# Patient Record
Sex: MALE | Race: WHITE | NOT HISPANIC OR LATINO | Employment: UNEMPLOYED | URBAN - METROPOLITAN AREA
[De-identification: names, ages, dates, MRNs, and addresses within clinical notes are randomized per-mention and may not be internally consistent; named-entity substitution may affect disease eponyms.]

---

## 2020-01-15 ENCOUNTER — ANESTHESIA EVENT (OUTPATIENT)
Dept: SURGERY | Facility: HOSPITAL | Age: 40
End: 2020-01-15
Payer: MEDICAID

## 2020-01-15 ENCOUNTER — HOSPITAL ENCOUNTER (OUTPATIENT)
Dept: PREADMISSION TESTING | Facility: HOSPITAL | Age: 40
Discharge: HOME | End: 2020-01-15
Attending: ORTHOPAEDIC SURGERY
Payer: MEDICAID

## 2020-01-15 DIAGNOSIS — Z01.818 PREOP EXAMINATION: Primary | ICD-10-CM

## 2020-01-15 RX ORDER — ACETAMINOPHEN 500 MG
1000 TABLET ORAL
Status: CANCELLED | OUTPATIENT
Start: 2020-01-16

## 2020-01-15 RX ORDER — LIDOCAINE HYDROCHLORIDE 10 MG/ML
1 INJECTION, SOLUTION EPIDURAL; INFILTRATION; INTRACAUDAL; PERINEURAL ONCE
Status: CANCELLED | OUTPATIENT
Start: 2020-01-15 | End: 2020-01-15

## 2020-01-15 RX ORDER — SODIUM CHLORIDE, SODIUM LACTATE, POTASSIUM CHLORIDE, CALCIUM CHLORIDE 600; 310; 30; 20 MG/100ML; MG/100ML; MG/100ML; MG/100ML
INJECTION, SOLUTION INTRAVENOUS CONTINUOUS
Status: CANCELLED | OUTPATIENT
Start: 2020-01-15

## 2020-01-15 RX ORDER — CEFAZOLIN SODIUM 2 G/50ML
2 SOLUTION INTRAVENOUS
Status: CANCELLED | OUTPATIENT
Start: 2020-01-16

## 2020-01-15 NOTE — ANESTHESIA PREPROCEDURE EVALUATION
01/15/2020  Kojo Parada is a 39 y.o., male scheduled for right capsulorrhaphy under REG/GETA on 1/16/2020.    Past Surgical History:   Procedure Laterality Date    CHOLECYSTECTOMY      TONSILLECTOMY      TYMPANOSTOMY TUBE PLACEMENT       Anesthesia Evaluation    I have reviewed the Patient Summary Reports.    I have reviewed the Nursing Notes.   I have reviewed the Medications.     Review of Systems  Anesthesia Hx:  No problems with previous Anesthesia  Denies Family Hx of Anesthesia complications.    Social:  Smoker, Social Alcohol Use    Hematology/Oncology:  Hematology Normal        Cardiovascular:  Cardiovascular Normal Exercise tolerance: good   Denies Angina.    Pulmonary:   Denies Shortness of breath.    Renal/:  Renal/ Normal     Hepatic/GI:  Hepatic/GI Normal    Neurological:  Neurology Normal    Endocrine:  Endocrine Normal        Physical Exam  General:  Well nourished    Airway/Jaw/Neck:  Airway Findings: Mouth Opening: Normal Tongue: Normal  General Airway Assessment: Adult  Mallampati: III       Chest/Lungs:  Chest/Lungs Findings: Clear to auscultation, Normal Respiratory Rate     Heart/Vascular:  Heart Findings: Rate: Normal  Rhythm: Regular Rhythm  Sounds: Normal        Mental Status:  Mental Status Findings:  Cooperative, Alert and Oriented         Anesthesia Plan  Type of Anesthesia, risks & benefits discussed:  Anesthesia Type:  regional, general  Patient's Preference:   Intra-op Monitoring Plan: standard ASA monitors  Intra-op Monitoring Plan Comments:   Post Op Pain Control Plan: multimodal analgesia  Post Op Pain Control Plan Comments:   Induction:   IV  Beta Blocker:  Patient is not currently on a Beta-Blocker (No further documentation required).       Informed Consent: Patient understands risks and agrees with Anesthesia plan.  Questions answered. Anesthesia consent  signed with patient.  ASA Score: 2     Day of Surgery Review of History & Physical:        Anesthesia Plan Notes: Anesthesia consent to be signed prior to procedure on 1/16/2020          Ready For Surgery From Anesthesia Perspective.

## 2020-01-15 NOTE — DISCHARGE INSTRUCTIONS
Your surgery is scheduled for 1/16/20.    Please report to Procedure Check In Room on the 2nd FLOOR at 5:30 a.m.          INSTRUCTIONS IMPORTANT!!!  ¨ Do not eat or drink after 12 midnight-including water. OK to brush teeth, no   gum, candy or mints!      ____  Proceed to [facility] on 1/15/20 for additional testing.        ____  Do not wear makeup, including mascara.  ____  No powder, lotions or creams to surgical area.  ____  Please remove all jewelry, including piercings and leave at home.  ____  No money or valuables needed. Please leave at home.  ____  Please bring any documents given by your doctor.  ____  If going home the same day, arrange for a ride home. You will not be able to             drive if Anesthesia was used.  ____  Children under 18 years require a parent / guardian present the entire time             they are in surgery / recovery.  ____  Wear loose fitting clothing. Allow for dressings, bandages.  ____  Stop Aspirin, Ibuprofen, Motrin and Aleve at least 3-5 days before surgery, unless otherwise instructed by your doctor, or the nurse.   You MAY use Tylenol/acetaminophen until day of surgery.  ____  Wash the surgical area with Hibiclens the night before surgery, and again the             morning of surgery.  Be sure to rinse hibiclens off completely (if instructed by nurse).  ____  If you take diabetic medication, do not take am of surgery unless instructed by Doctor.  ____  Call MD for temperature above 101 degrees or any other signs of infection such as Urinary (bladder) infection, Upper respiratory infection, skin boils, etc.  ____ Stop taking any Fish Oil supplement or any Vitamins that contain Vitamin E at least 5 days prior to surgery.  ____ Do Not wear your contact lenses the day of your procedure.  You may wear your glasses.      ____Do not shave surgical site for 3 days prior to surgery.  ____ Practice Good hand washing before, during, and after procedure.      I have  read or had read and explained to me, and understand the above information.  Additional comments or instructions:  For additional questions call [phone]      ANESTHESIA SIDE EFFECTS  -For the first 24 hours after surgery:  Do not drive, use heavy equipment, make important decisions, or drink alcohol  -It is normal to feel sleepy for several hours.  Rest until you are more awake.  -Have someone stay with you, if needed.  They can watch for problems and help keep you safe.  -Some possible post anesthesia side effects include: nausea and vomiting, sore throat and hoarseness, sleepiness, and dizziness.        Pre-Op Bathing Instructions    Before surgery, you can play an important role in your own health.    Because skin is not sterile, we need to be sure that your skin is as free of germs as possible. By following the instructions below, you can reduce the number of germs on your skin before surgery.    IMPORTANT: You will need to shower with a special soap called Hibiclens*, available at any pharmacy.  If you are allergic to Chlorhexidine (the antiseptic in Hibiclens), use an antibacterial soap such as Dial Soap for your preoperative shower.  You will shower with Hibiclens both the night before your surgery and the morning of your surgery.  Do not use Hibiclens on the head, face or genitals to avoid injury to those areas.    STEP #1: THE NIGHT BEFORE YOUR SURGERY     1. Do not shave the area of your body where your surgery will be performed.  2. Shower and wash your hair and body as usual with your normal soap and shampoo.  3. Rinse your hair and body thoroughly after you shower to remove all soap residue.  4. With your hand, apply one packet of Hibiclens soap to the surgical site.   5. Wash the site gently for five (5) minutes. Do not scrub your skin too hard.   6. Do not wash with your regular soap after Hibiclens is used.  7. Rinse your body thoroughly.  8. Pat yourself dry with a clean, soft towel.  9. Do not use  lotion, cream, or powder.  10. Wear clean clothes.    STEP #2: THE MORNING OF YOUR SURGERY     1. Repeat Step #1.    * Not to be used by people allergic to Chlorhexidine.

## 2020-01-16 ENCOUNTER — ANESTHESIA (OUTPATIENT)
Dept: SURGERY | Facility: HOSPITAL | Age: 40
End: 2020-01-16
Payer: MEDICAID

## 2020-01-16 ENCOUNTER — HOSPITAL ENCOUNTER (OUTPATIENT)
Facility: HOSPITAL | Age: 40
Discharge: HOME | End: 2020-01-16
Attending: ORTHOPAEDIC SURGERY | Admitting: ORTHOPAEDIC SURGERY
Payer: MEDICAID

## 2020-01-16 VITALS
BODY MASS INDEX: 35.21 KG/M2 | OXYGEN SATURATION: 98 % | SYSTOLIC BLOOD PRESSURE: 115 MMHG | HEART RATE: 84 BPM | WEIGHT: 260 LBS | DIASTOLIC BLOOD PRESSURE: 71 MMHG | TEMPERATURE: 98 F | RESPIRATION RATE: 20 BRPM | HEIGHT: 72 IN

## 2020-01-16 DIAGNOSIS — Z98.890 S/P SHOULDER SURGERY: ICD-10-CM

## 2020-01-16 DIAGNOSIS — Z01.818 PREOP EXAMINATION: ICD-10-CM

## 2020-01-16 PROCEDURE — 71000039 HC RECOVERY, EACH ADD'L HOUR: Performed by: ORTHOPAEDIC SURGERY

## 2020-01-16 PROCEDURE — 71000016 HC POSTOP RECOV ADDL HR: Performed by: ORTHOPAEDIC SURGERY

## 2020-01-16 PROCEDURE — 25000003 PHARM REV CODE 250: Performed by: ORTHOPAEDIC SURGERY

## 2020-01-16 PROCEDURE — 63600175 PHARM REV CODE 636 W HCPCS: Performed by: NURSE PRACTITIONER

## 2020-01-16 PROCEDURE — 25000003 PHARM REV CODE 250: Performed by: STUDENT IN AN ORGANIZED HEALTH CARE EDUCATION/TRAINING PROGRAM

## 2020-01-16 PROCEDURE — 63600175 PHARM REV CODE 636 W HCPCS: Performed by: STUDENT IN AN ORGANIZED HEALTH CARE EDUCATION/TRAINING PROGRAM

## 2020-01-16 PROCEDURE — 71000033 HC RECOVERY, INTIAL HOUR: Performed by: ORTHOPAEDIC SURGERY

## 2020-01-16 PROCEDURE — 37000008 HC ANESTHESIA 1ST 15 MINUTES: Performed by: ORTHOPAEDIC SURGERY

## 2020-01-16 PROCEDURE — C1713 ANCHOR/SCREW BN/BN,TIS/BN: HCPCS | Performed by: ORTHOPAEDIC SURGERY

## 2020-01-16 PROCEDURE — 63600175 PHARM REV CODE 636 W HCPCS: Performed by: ORTHOPAEDIC SURGERY

## 2020-01-16 PROCEDURE — 37000009 HC ANESTHESIA EA ADD 15 MINS: Performed by: ORTHOPAEDIC SURGERY

## 2020-01-16 PROCEDURE — C1769 GUIDE WIRE: HCPCS | Performed by: ORTHOPAEDIC SURGERY

## 2020-01-16 PROCEDURE — 71000015 HC POSTOP RECOV 1ST HR: Performed by: ORTHOPAEDIC SURGERY

## 2020-01-16 PROCEDURE — 64415 NJX AA&/STRD BRCH PLXS IMG: CPT | Mod: RT,59 | Performed by: STUDENT IN AN ORGANIZED HEALTH CARE EDUCATION/TRAINING PROGRAM

## 2020-01-16 PROCEDURE — 76942 ECHO GUIDE FOR BIOPSY: CPT | Performed by: STUDENT IN AN ORGANIZED HEALTH CARE EDUCATION/TRAINING PROGRAM

## 2020-01-16 PROCEDURE — 36000707: Performed by: ORTHOPAEDIC SURGERY

## 2020-01-16 PROCEDURE — C9290 INJ, BUPIVACAINE LIPOSOME: HCPCS | Performed by: STUDENT IN AN ORGANIZED HEALTH CARE EDUCATION/TRAINING PROGRAM

## 2020-01-16 PROCEDURE — 01630 ANES OPN/ARTHR PX SHO JT NOS: CPT | Performed by: ORTHOPAEDIC SURGERY

## 2020-01-16 PROCEDURE — 36000706: Performed by: ORTHOPAEDIC SURGERY

## 2020-01-16 PROCEDURE — 27201423 OPTIME MED/SURG SUP & DEVICES STERILE SUPPLY: Performed by: ORTHOPAEDIC SURGERY

## 2020-01-16 RX ORDER — HYDROMORPHONE HYDROCHLORIDE 2 MG/ML
0.5 INJECTION, SOLUTION INTRAMUSCULAR; INTRAVENOUS; SUBCUTANEOUS EVERY 5 MIN PRN
Status: ACTIVE | OUTPATIENT
Start: 2020-01-16 | End: 2020-01-16

## 2020-01-16 RX ORDER — CEFAZOLIN SODIUM 1 G/3ML
INJECTION, POWDER, FOR SOLUTION INTRAMUSCULAR; INTRAVENOUS
Status: DISCONTINUED | OUTPATIENT
Start: 2020-01-16 | End: 2020-01-16

## 2020-01-16 RX ORDER — KETOROLAC TROMETHAMINE 30 MG/ML
INJECTION, SOLUTION INTRAMUSCULAR; INTRAVENOUS
Status: DISCONTINUED | OUTPATIENT
Start: 2020-01-16 | End: 2020-01-16

## 2020-01-16 RX ORDER — BUPIVACAINE HYDROCHLORIDE 2.5 MG/ML
INJECTION, SOLUTION EPIDURAL; INFILTRATION; INTRACAUDAL
Status: DISCONTINUED | OUTPATIENT
Start: 2020-01-16 | End: 2020-01-16

## 2020-01-16 RX ORDER — ONDANSETRON 2 MG/ML
INJECTION INTRAMUSCULAR; INTRAVENOUS
Status: DISCONTINUED | OUTPATIENT
Start: 2020-01-16 | End: 2020-01-16

## 2020-01-16 RX ORDER — MIDAZOLAM HYDROCHLORIDE 1 MG/ML
INJECTION, SOLUTION INTRAMUSCULAR; INTRAVENOUS
Status: DISCONTINUED | OUTPATIENT
Start: 2020-01-16 | End: 2020-01-16

## 2020-01-16 RX ORDER — FENTANYL CITRATE 50 UG/ML
INJECTION, SOLUTION INTRAMUSCULAR; INTRAVENOUS
Status: DISCONTINUED | OUTPATIENT
Start: 2020-01-16 | End: 2020-01-16

## 2020-01-16 RX ORDER — OXYCODONE AND ACETAMINOPHEN 10; 325 MG/1; MG/1
1 TABLET ORAL EVERY 4 HOURS PRN
Qty: 28 TABLET | Refills: 0 | Status: SHIPPED | OUTPATIENT
Start: 2020-01-16

## 2020-01-16 RX ORDER — NEOSTIGMINE METHYLSULFATE 1 MG/ML
INJECTION, SOLUTION INTRAVENOUS
Status: DISCONTINUED | OUTPATIENT
Start: 2020-01-16 | End: 2020-01-16

## 2020-01-16 RX ORDER — GLYCOPYRROLATE 0.2 MG/ML
INJECTION INTRAMUSCULAR; INTRAVENOUS
Status: DISCONTINUED | OUTPATIENT
Start: 2020-01-16 | End: 2020-01-16

## 2020-01-16 RX ORDER — LIDOCAINE HCL/PF 100 MG/5ML
SYRINGE (ML) INTRAVENOUS
Status: DISCONTINUED | OUTPATIENT
Start: 2020-01-16 | End: 2020-01-16

## 2020-01-16 RX ORDER — ACETAMINOPHEN 500 MG
1000 TABLET ORAL
Status: DISCONTINUED | OUTPATIENT
Start: 2020-01-16 | End: 2020-01-16 | Stop reason: HOSPADM

## 2020-01-16 RX ORDER — PROPOFOL 10 MG/ML
VIAL (ML) INTRAVENOUS
Status: DISCONTINUED | OUTPATIENT
Start: 2020-01-16 | End: 2020-01-16

## 2020-01-16 RX ORDER — SUCCINYLCHOLINE CHLORIDE 20 MG/ML
INJECTION INTRAMUSCULAR; INTRAVENOUS
Status: DISCONTINUED | OUTPATIENT
Start: 2020-01-16 | End: 2020-01-16

## 2020-01-16 RX ORDER — HYDROMORPHONE HYDROCHLORIDE 2 MG/ML
0.2 INJECTION, SOLUTION INTRAMUSCULAR; INTRAVENOUS; SUBCUTANEOUS EVERY 5 MIN PRN
Status: ACTIVE | OUTPATIENT
Start: 2020-01-16 | End: 2020-01-16

## 2020-01-16 RX ORDER — ROCURONIUM BROMIDE 10 MG/ML
INJECTION, SOLUTION INTRAVENOUS
Status: DISCONTINUED | OUTPATIENT
Start: 2020-01-16 | End: 2020-01-16

## 2020-01-16 RX ORDER — OXYCODONE AND ACETAMINOPHEN 5; 325 MG/1; MG/1
1 TABLET ORAL ONCE
Status: COMPLETED | OUTPATIENT
Start: 2020-01-16 | End: 2020-01-16

## 2020-01-16 RX ORDER — OXYCODONE HYDROCHLORIDE 5 MG/1
5 TABLET ORAL
Status: DISCONTINUED | OUTPATIENT
Start: 2020-01-16 | End: 2020-01-16 | Stop reason: HOSPADM

## 2020-01-16 RX ORDER — SODIUM CHLORIDE, SODIUM LACTATE, POTASSIUM CHLORIDE, CALCIUM CHLORIDE 600; 310; 30; 20 MG/100ML; MG/100ML; MG/100ML; MG/100ML
INJECTION, SOLUTION INTRAVENOUS CONTINUOUS PRN
Status: DISCONTINUED | OUTPATIENT
Start: 2020-01-16 | End: 2020-01-16

## 2020-01-16 RX ORDER — HYDROMORPHONE HYDROCHLORIDE 2 MG/ML
INJECTION, SOLUTION INTRAMUSCULAR; INTRAVENOUS; SUBCUTANEOUS
Status: DISCONTINUED | OUTPATIENT
Start: 2020-01-16 | End: 2020-01-16

## 2020-01-16 RX ORDER — PHENYLEPHRINE HYDROCHLORIDE 10 MG/ML
INJECTION INTRAVENOUS
Status: DISCONTINUED | OUTPATIENT
Start: 2020-01-16 | End: 2020-01-16

## 2020-01-16 RX ORDER — EPINEPHRINE 1 MG/ML
INJECTION INTRAMUSCULAR; INTRAVENOUS; SUBCUTANEOUS
Status: DISCONTINUED | OUTPATIENT
Start: 2020-01-16 | End: 2020-01-16 | Stop reason: HOSPADM

## 2020-01-16 RX ORDER — SODIUM CHLORIDE, SODIUM LACTATE, POTASSIUM CHLORIDE, CALCIUM CHLORIDE 600; 310; 30; 20 MG/100ML; MG/100ML; MG/100ML; MG/100ML
INJECTION, SOLUTION INTRAVENOUS CONTINUOUS
Status: DISCONTINUED | OUTPATIENT
Start: 2020-01-16 | End: 2020-01-16 | Stop reason: HOSPADM

## 2020-01-16 RX ORDER — LIDOCAINE HYDROCHLORIDE 10 MG/ML
1 INJECTION, SOLUTION EPIDURAL; INFILTRATION; INTRACAUDAL; PERINEURAL ONCE
Status: DISCONTINUED | OUTPATIENT
Start: 2020-01-16 | End: 2020-01-16 | Stop reason: HOSPADM

## 2020-01-16 RX ORDER — ONDANSETRON 2 MG/ML
4 INJECTION INTRAMUSCULAR; INTRAVENOUS DAILY PRN
Status: DISCONTINUED | OUTPATIENT
Start: 2020-01-16 | End: 2020-01-16 | Stop reason: HOSPADM

## 2020-01-16 RX ADMIN — PHENYLEPHRINE HYDROCHLORIDE 100 MCG: 10 INJECTION INTRAVENOUS at 09:01

## 2020-01-16 RX ADMIN — SODIUM CHLORIDE, SODIUM LACTATE, POTASSIUM CHLORIDE, AND CALCIUM CHLORIDE: .6; .31; .03; .02 INJECTION, SOLUTION INTRAVENOUS at 06:01

## 2020-01-16 RX ADMIN — PHENYLEPHRINE HYDROCHLORIDE 100 MCG: 10 INJECTION INTRAVENOUS at 08:01

## 2020-01-16 RX ADMIN — PHENYLEPHRINE HYDROCHLORIDE 100 MCG: 10 INJECTION INTRAVENOUS at 10:01

## 2020-01-16 RX ADMIN — PHENYLEPHRINE HYDROCHLORIDE 200 MCG: 10 INJECTION INTRAVENOUS at 07:01

## 2020-01-16 RX ADMIN — BUPIVACAINE HYDROCHLORIDE 10 ML: 2.5 INJECTION, SOLUTION EPIDURAL; INFILTRATION; INTRACAUDAL; PERINEURAL at 06:01

## 2020-01-16 RX ADMIN — PHENYLEPHRINE HYDROCHLORIDE 200 MCG: 10 INJECTION INTRAVENOUS at 10:01

## 2020-01-16 RX ADMIN — SUCCINYLCHOLINE CHLORIDE 120 MG: 20 INJECTION, SOLUTION INTRAMUSCULAR; INTRAVENOUS at 07:01

## 2020-01-16 RX ADMIN — ROCURONIUM BROMIDE 10 MG: 10 INJECTION, SOLUTION INTRAVENOUS at 08:01

## 2020-01-16 RX ADMIN — ACETAMINOPHEN 1000 MG: 500 TABLET ORAL at 06:01

## 2020-01-16 RX ADMIN — MIDAZOLAM 5 MG: 1 INJECTION INTRAMUSCULAR; INTRAVENOUS at 06:01

## 2020-01-16 RX ADMIN — HYDROMORPHONE HYDROCHLORIDE 0.4 MG: 2 INJECTION INTRAMUSCULAR; INTRAVENOUS; SUBCUTANEOUS at 10:01

## 2020-01-16 RX ADMIN — HYDROMORPHONE HYDROCHLORIDE 0.6 MG: 2 INJECTION INTRAMUSCULAR; INTRAVENOUS; SUBCUTANEOUS at 09:01

## 2020-01-16 RX ADMIN — ROCURONIUM BROMIDE 10 MG: 10 INJECTION, SOLUTION INTRAVENOUS at 09:01

## 2020-01-16 RX ADMIN — OXYCODONE HYDROCHLORIDE AND ACETAMINOPHEN 1 TABLET: 5; 325 TABLET ORAL at 11:01

## 2020-01-16 RX ADMIN — KETOROLAC TROMETHAMINE 30 MG: 30 INJECTION, SOLUTION INTRAMUSCULAR; INTRAVENOUS at 10:01

## 2020-01-16 RX ADMIN — ROCURONIUM BROMIDE 10 MG: 10 INJECTION, SOLUTION INTRAVENOUS at 07:01

## 2020-01-16 RX ADMIN — CEFAZOLIN 2 G: 330 INJECTION, POWDER, FOR SOLUTION INTRAMUSCULAR; INTRAVENOUS at 07:01

## 2020-01-16 RX ADMIN — BUPIVACAINE 10 ML: 13.3 INJECTION, SUSPENSION, LIPOSOMAL INFILTRATION at 06:01

## 2020-01-16 RX ADMIN — SODIUM CHLORIDE, SODIUM LACTATE, POTASSIUM CHLORIDE, AND CALCIUM CHLORIDE: .6; .31; .03; .02 INJECTION, SOLUTION INTRAVENOUS at 07:01

## 2020-01-16 RX ADMIN — HYDROMORPHONE HYDROCHLORIDE 0.5 MG: 2 INJECTION INTRAMUSCULAR; INTRAVENOUS; SUBCUTANEOUS at 10:01

## 2020-01-16 RX ADMIN — OXYCODONE HYDROCHLORIDE 5 MG: 5 TABLET ORAL at 11:01

## 2020-01-16 RX ADMIN — SODIUM CHLORIDE, SODIUM LACTATE, POTASSIUM CHLORIDE, AND CALCIUM CHLORIDE: .6; .31; .03; .02 INJECTION, SOLUTION INTRAVENOUS at 08:01

## 2020-01-16 RX ADMIN — NEOSTIGMINE METHYLSULFATE 5 MG: 1 INJECTION INTRAVENOUS at 10:01

## 2020-01-16 RX ADMIN — PROPOFOL 200 MG: 10 INJECTION, EMULSION INTRAVENOUS at 07:01

## 2020-01-16 RX ADMIN — LIDOCAINE HYDROCHLORIDE 100 MG: 20 INJECTION, SOLUTION INTRAVENOUS at 07:01

## 2020-01-16 RX ADMIN — ROCURONIUM BROMIDE 5 MG: 10 INJECTION, SOLUTION INTRAVENOUS at 07:01

## 2020-01-16 RX ADMIN — GLYCOPYRROLATE 0.8 MG: 0.2 INJECTION, SOLUTION INTRAMUSCULAR; INTRAVENOUS at 10:01

## 2020-01-16 RX ADMIN — ROCURONIUM BROMIDE 20 MG: 10 INJECTION, SOLUTION INTRAVENOUS at 08:01

## 2020-01-16 RX ADMIN — ROCURONIUM BROMIDE 25 MG: 10 INJECTION, SOLUTION INTRAVENOUS at 07:01

## 2020-01-16 RX ADMIN — ONDANSETRON 4 MG: 2 INJECTION, SOLUTION INTRAMUSCULAR; INTRAVENOUS at 10:01

## 2020-01-16 RX ADMIN — FENTANYL CITRATE 100 MCG: 50 INJECTION, SOLUTION INTRAMUSCULAR; INTRAVENOUS at 07:01

## 2020-01-16 RX ADMIN — PHENYLEPHRINE HYDROCHLORIDE 50 MCG: 10 INJECTION INTRAVENOUS at 08:01

## 2020-01-16 NOTE — ANESTHESIA PROCEDURE NOTES
Peripheral Block    Patient location during procedure: pre-op   Block not for primary anesthetic.  Reason for block: at surgeon's request and post-op pain management   Post-op Pain Location: right arm   Start time: 1/16/2020 6:52 AM  Timeout: 1/16/2020 6:50 AM   End time: 1/16/2020 6:54 AM    Staffing  Authorizing Provider: Akhil Pacheco MD  Performing Provider: Stephanie Dodd MD    Preanesthetic Checklist  Completed: patient identified, site marked, surgical consent, pre-op evaluation, timeout performed, IV checked, risks and benefits discussed and monitors and equipment checked  Peripheral Block  Patient position: sitting  Prep: ChloraPrep  Patient monitoring: heart rate, cardiac monitor, continuous pulse ox, continuous capnometry and frequent blood pressure checks  Block type: interscalene  Laterality: right  Injection technique: single shot  Needle  Needle type: Stimuplex   Needle gauge: 21 G  Needle length: 4 in  Needle localization: anatomical landmarks and ultrasound guidance   -ultrasound image captured on disc.  Assessment  Injection assessment: negative aspiration, negative parasthesia and local visualized surrounding nerve  Paresthesia pain: none  Heart rate change: no  Slow fractionated injection: yes  Additional Notes  VSS.  [facility] RN monitoring vitals throughout procedure.  Patient tolerated procedure well.     10 cc of 0.25% bupivacaine with 10 cc of exparel injected without complication.

## 2020-01-16 NOTE — DISCHARGE INSTRUCTIONS
Shoulder Joint Surgery: Bankart Repair  A Bankart lesion is a shoulder injury. It happens when there is a tear in the labrum. This is the fibrous cartilage that helps hold the shoulder joint in place. Surgery can repair this injury. This surgery may be done through a few small incisions called arthroscopic surgery, or it may be done through one larger incision known as open surgery. You and your healthcare provider will discuss which method is right for you.    After Shoulder Surgery (SLAP Repair or Bankart Repair)    After repair of your shoulder joint, you may go home the same day of your surgery. Or you may spend 24 hours in the hospital. Take care of your shoulder while its healing and follow all instructions you are given. Full healing usually takes 3 to 4 months.  At home  Here is what to expect at home after surgery:   · Wear your sling or brace at all times, except when bathing or changing clothes. When your arm is out of the immobilizer, keep it at your side.  · You will need to wear your sling or brace for 3 to 4 weeks.  · Don't get your bandages or the incision(s) wet. Your surgeon may allow you to change your bandages after 2 or 3 days. Replace the bandages with a clean gauze dressing. Do not touch your incision.  · Take your pain medicines as prescribed. If the medicines dont control the pain, call your healthcare provider.  · Use ice on your shoulder, as instructed. Ice your shoulder for no more than 15 minutes at a time.  · See your healthcare provider for an appointment 7 to 10 days after surgery. During this appointment, your stitches or staples may be removed, if you have them.  · You will continue to have follow-up visits until your shoulder is healed. Keep these appointments to help ensure your shoulder heals properly.  · Don't drive until you are no longer taking prescription pain medicines and your healthcare provider says its OK, no sooner than two weeks after  surgery.  Rehabilitation  After surgery, you will be given exercises to do. You will be instructed to start certain range-of-motion exercises right after surgery. After the sling is removed, you will be given other exercises to help with the flexibility and strength of your shoulder and arm. In many cases, you may be referred to a physical therapist for rehabilitation.  Call your healthcare provider  Contact your healthcare provider if you have:  · An increase in pain or swelling  · Your arm tingles or feels numb  · You have a fever of 100.4°F (38°C) or higher, or as directed by your healthcare provider  · Your shoulder bleeds excessively or drains   Date Last Reviewed: 10/12/2015  © 6885-3873 [employer]. [address] [place] [address]. All rights reserved. This information is not intended as a substitute for professional medical care. Always follow your healthcare professional's instructions.        Possible risks and complications of shoulder surgery  Here are some the risks and complications:   · Infection  · Damage to nerves or blood vessels  · Moving or breaking of surgical anchors  · Stiffness or pain  · Recurring instability of the shoulder joint   Date Last Reviewed: 10/12/2015  © 9018-8245 [employer]. [address] [place] [address]. All rights reserved. This information is not intended as a substitute for professional medical care. Always follow your healthcare professional's instructions.    ANESTHESIA  -For the first 24 hours after surgery:  Do not drive, use heavy equipment, make important decisions, or drink alcohol  -It is normal to feel sleepy for several hours.  Rest until you are more awake.  -Have someone stay with you, if needed.  They can watch for problems and help keep you safe.  -Some possible post anesthesia side effects include: nausea and vomiting, sore throat and hoarseness, sleepiness, and dizziness.    PAIN  -If you have pain after  surgery, pain medicine will help you feel better.  Take it as directed, before pain becomes severe.  Most pain relievers taken by mouth need at least 20-30 minutes to start working.  -Do not drive or drink alcohol while taking pain medicine.  -Pain medication can upset your stomach.  Taking them with a little food may help.  -Other ways to help control pain: elevation, ice, and relaxation  -Call your surgeon if still having unmanageable pain an hour after taking pain medicine.  -Pain medicine can cause constipation.  Taking an over-the counter stool softener while on prescription pain medicine and drinking plenty of fluids can prevent this side effect.  -Call your surgeon if you have severe side effects like: breathing problems, trouble waking up, dizziness, confusion, or severe constipation.    NAUSEA  -Some people have nausea after surgery.  This is often because of anesthesia, pain, pain medicine, or the stress of surgery.  -Do not push yourself to eat.  Start off with clear liquids and soup.  Slowly move to solid foods.  Don't eat fatty, rich, spicy foods at first.  Eat smaller amounts.  -If you develop persistent nausea and vomiting please notify your surgeon immediately.    BLEEDING  -Different types of surgery require different types of care and dressing changes.  It is important to follow all instructions and advice from your surgeon.  Change dressing as directed.  Call your surgeon for any concerns regarding postop bleeding.    SIGNS OF INFECTION  -Signs of infection include: fever, swelling, drainage, and redness  -Notify your surgeon if you have a fever of 100.4 F (38.0 C) or higher.  -Notify your surgeon if you notice redness, swelling, increased pain, pus, or a foul smell at the incision site.      Oxycodone tablets or capsules  What is this medicine?  OXYCODONE (ox i KOE done) is a pain reliever. It is used to treat moderate to severe pain.  How should I use this medicine?  Take this medicine by mouth  with a glass of water. Follow the directions on the prescription label. You can take it with or without food. If it upsets your stomach, take it with food. Take your medicine at regular intervals. Do not take it more often than directed. Do not stop taking except on your doctor's advice.  Some brands of this medicine, like Oxecta, have special instructions. Ask your doctor or pharmacist if these directions are for you: Do not cut, crush or chew this medicine. Swallow only one tablet at a time. Do not wet, soak, or lick the tablet before you take it.  A special MedGuide will be given to you by the pharmacist with each prescription and refill. Be sure to read this information carefully each time.  Talk to your pediatrician regarding the use of this medicine in children. Special care may be needed.  What side effects may I notice from receiving this medicine?  Side effects that you should report to your doctor or health care professional as soon as possible:  · allergic reactions like skin rash, itching or hives, swelling of the face, lips, or tongue  · breathing problems  · confusion  · signs and symptoms of low blood pressure like dizziness; feeling faint or lightheaded, falls; unusually weak or tired  · trouble passing urine or change in the amount of urine  · trouble swallowing  Side effects that usually do not require medical attention (report to your doctor or health care professional if they continue or are bothersome):  · constipation  · dry mouth  · nausea, vomiting  · tiredness  What may interact with this medicine?  This medicine may interact with the following medications:  · alcohol  · antihistamines for allergy, cough and cold  · antiviral medicines for HIV or AIDS  · atropine  · certain antibiotics like clarithromycin, erythromycin, linezolid, rifampin  · certain medicines for anxiety or sleep  · certain medicines for bladder problems like oxybutynin, tolterodine  · certain medicines for depression like  amitriptyline, fluoxetine, sertraline  · certain medicines for fungal infections like ketoconazole, itraconazole, voriconazole  · certain medicines for migraine headache like almotriptan, eletriptan, frovatriptan, naratriptan, rizatriptan, sumatriptan, zolmitriptan  · certain medicines for nausea or vomiting like dolasetron, ondansetron, palonosetron  · certain medicines for Parkinson's disease like benztropine, trihexyphenidyl  · certain medicines for seizures like phenobarbital, phenytoin, primidone  · certain medicines for stomach problems like dicyclomine, hyoscyamine  · certain medicines for travel sickness like scopolamine  · diuretics  · general anesthetics like halothane, isoflurane, methoxyflurane, propofol  · ipratropium  · local anesthetics like lidocaine, pramoxine, tetracaine  · MAOIs like Carbex, Eldepryl, Marplan, Nardil, and Parnate  · medicines that relax muscles for surgery  · methylene blue  · nilotinib  · other narcotic medicines for pain or cough  · phenothiazines like chlorpromazine, mesoridazine, prochlorperazine, thioridazine  What if I miss a dose?  If you miss a dose, take it as soon as you can. If it is almost time for your next dose, take only that dose. Do not take double or extra doses.  Where should I keep my medicine?  Keep out of the reach of children. This medicine can be abused. Keep your medicine in a safe place to protect it from theft. Do not share this medicine with anyone. Selling or giving away this medicine is dangerous and against the law.  Store at room temperature between 15 and 30 degrees C (59 and 86 degrees F). Protect from light. Keep container tightly closed.  This medicine may cause accidental overdose and death if it is taken by other adults, children, or pets. Flush any unused medicine down the toilet to reduce the chance of harm. Do not use the medicine after the expiration date.  What should I tell my health care provider before I take this medicine?  They need  to know if you have any of these conditions:  · Humphreys's disease  · brain tumor  · head injury  · heart disease  · history of drug or alcohol abuse problem  · if you often drink alcohol  · kidney disease  · liver disease  · lung or breathing disease, like asthma  · mental illness  · pancreatic disease  · seizures  · thyroid disease  · an unusual or allergic reaction to oxycodone, codeine, hydrocodone, morphine, other medicines, foods, dyes, or preservatives  · pregnant or trying to get pregnant  · breast-feeding  What should I watch for while using this medicine?  Tell your doctor or health care professional if your pain does not go away, if it gets worse, or if you have new or a different type of pain. You may develop tolerance to the medicine. Tolerance means that you will need a higher dose of the medicine for pain relief. Tolerance is normal and is expected if you take this medicine for a long time.  Do not suddenly stop taking your medicine because you may develop a severe reaction. Your body becomes used to the medicine. This does NOT mean you are addicted. Addiction is a behavior related to getting and using a drug for a non-medical reason. If you have pain, you have a medical reason to take pain medicine. Your doctor will tell you how much medicine to take. If your doctor wants you to stop the medicine, the dose will be slowly lowered over time to avoid any side effects.  There are different types of narcotic medicines (opiates). If you take more than one type at the same time or if you are taking another medicine that also causes drowsiness, you may have more side effects. Give your health care provider a list of all medicines you use. Your doctor will tell you how much medicine to take. Do not take more medicine than directed. Call emergency for help if you have problems breathing or unusual sleepiness.  You may get drowsy or dizzy. Do not drive, use machinery, or do anything that needs mental alertness  until you know how the medicine affects you. Do not stand or sit up quickly, especially if you are an older patient. This reduces the risk of dizzy or fainting spells. Alcohol may interfere with the effect of this medicine. Avoid alcoholic drinks.  This medicine will cause constipation. Try to have a bowel movement at least every 2 to 3 days. If you do not have a bowel movement for 3 days, call your doctor or health care professional.  Your mouth may get dry. Chewing sugarless gum or sucking hard candy, and drinking plenty of water may help. Contact your doctor if the problem does not go away or is severe.  NOTE:This sheet is a summary. It may not cover all possible information. If you have questions about this medicine, talk to your doctor, pharmacist, or health care provider. Copyright© 2017 [employer]

## 2020-01-16 NOTE — PLAN OF CARE
Vomited small amount clear liquids; states feeling better after vomiting,refuses any antiemetic & voicing desire to go home. Discharge instructions given to patient & family; verbalized understanding. Discharge home via wheelchair in care of family.

## 2020-01-16 NOTE — PLAN OF CARE
Signed out from pacu per Dr Pacheco. Followed patient care to ops. Transported to ops via stretcher,sr upx2.

## 2020-01-16 NOTE — ANESTHESIA RELEASE NOTE
Anesthesia Release from PACU Note    Patient: Kojo Parada    Procedure(s) Performed: Procedure(s) (LRB):  R OPEN LATARJET PROCEDURE GETA + INTERSCALENE ARTHREX LATARJET SYSTEM (OPEN case) BRENNA, 90 SAW BLADE, open shoulder set, System 7 with Garza TKA blade and /TPS  5.5 brenna MicroAire, Beach Chair Positioner, will scope before opening  Arthrex (Right)    Anesthesia type: general    Post pain: Adequate analgesia    Post assessment: no apparent anesthetic complications, tolerated procedure well and no evidence of recall    Last Vitals:   Visit Vitals  BP (!) 150/87   Pulse 98   Temp 36.7 °C (98.1 °F) (Skin)   Resp 20   Ht 6' (1.829 m)   Wt 117.9 kg (260 lb)   SpO2 98%   BMI 35.26 kg/m²       Post vital signs: stable    Level of consciousness: awake, alert  and oriented    Nausea/Vomiting: no nausea/no vomiting    Complications: none    Airway Patency: patent    Respiratory: unassisted    Cardiovascular: stable and blood pressure at baseline    Hydration: euvolemic

## 2020-01-16 NOTE — BRIEF OP NOTE
[facility]  Brief Operative Note    Surgery Date: 1/16/2020     Surgeon(s) and Role:     * Steve Garza MD - Primary    Assisting Surgeon: None    Pre-op Diagnosis:  Recurrent dislocation of right shoulder [M24.411]    Post-op Diagnosis:  Post-Op Diagnosis Codes:     * Recurrent dislocation of right shoulder [M24.411]    Procedure(s) (LRB):  R OPEN LATARJET PROCEDURE GETA + INTERSCALENE ARTHREX LATARJET SYSTEM (OPEN case) CHARLINE, 90 SAW BLADE, open shoulder set, System 7 with Kayla TKA blade and /TPS  5.5 charline MicroAire, Beach Chair Positioner, will scope before opening  Arthrex (Right)    Anesthesia: N/A    Description of the findings of the procedure(s): R shoulder Laterjet    Estimated Blood Loss: * No values recorded between 1/16/2020  7:56 AM and 1/16/2020 10:40 AM *         Specimens:   Specimen (12h ago, onward)    None            Discharge Note    OUTCOME: Patient tolerated treatment/procedure well without complication and is now ready for discharge.    DISPOSITION: Home or Self Care    FINAL DIAGNOSIS:  <principal problem not specified>    FOLLOWUP: In clinic, 1-2 weeks

## 2020-01-16 NOTE — OP NOTE
Date of surgery:  January 16, 2020    Facility [facility]    Surgeon:  Steve Garza MD    First assistant:  Garcia Garcia MD    Second assistant:  Vishnu Mullins MD    Preoperative diagnosis:  Recurrent right shoulder instability    Indication:  To improve shoulder stability    Postoperative diagnosis:  Recurrent right shoulder instability    Procedure:  Right open Latarjet procedure    The patient is a 39-year-old male with a chronic history of recurrent right shoulder instability. He has failed previous management including physical therapy.  He was dislocating so much that he started to dislocate within his sleep.  Preoperative exam confirmed significant anterior shoulder instability. Preoperative MRI and CT study demonstrated significant bone loss on the glenoid as well as a Hill-Sachs lesion.  After the risk, benefits, and alternatives were discussed in detail with the patient, he gave written informed consent to proceed with right shoulder diagnostic arthroscopy and open Latarjet procedure. The patient underwent a preoperative interscalene single-shot block. After the block, the patient was brought into the operating room. The patient was placed supine on the operating room table. General anesthesia was administered. The right shoulder was examined.  He was noted to have significant anterior 2+ translation of the humeral head with load and shift exam and could be fully dislocated with a drawer exam.  The patient was carefully positioned into a beach chair position with all bony prominences well padded and the cervical spine in a neutral position. Bilateral SCDs were utilized. A surgical time-out was performed to verify the correct extremity as well as preoperative minutes duration of IV antibiotics.  The patient was 1st prepped with hydrogen peroxide wipe down and then ChloraPrep.  First, diagnostic arthroscopy was performed. Using a posterior portal, the arthroscope was introduced  into the glenohumeral joint. On examination of the glenohumeral joint, no significant arthritic change was noted. There is evidence of significant bony deficiency of the anterior inferior portion of the glenoid.  Is also significant Hill-Sachs lesion.  The biceps tendon was found to be intact. The articular surface of the rotator cuff was intact. Scope was then removed and we proceeded to the open portion of the procedure. An incision was made from the tip of the coracoid down toward the axillary fold.  Dissection was performed with electrocautery down to the level of the deltoid.  The cephalic vein was identified. 1 large branch medial to the cephalic vein was tied off with 0 silk suture. The cephalic vein was then retracted laterally with the deltoid as the deltopectoral interval was developed. A Kolbel retractor was placed. Next the coracoid process was carefully identified. The CA ligament was identified. A key retractor was placed between the rotator cuff and CA ligament. The CA ligament was divided approximately a cm lateral to the coracoid process. Next, the pectoralis minor insertion site on the coracoid process was identified. The pectoralis minor was dissected off the medial side of the coracoid process using electrocautery. Next, soft tissue on the undersurface of the coracoid was divided. A Adrián retractor was then placed beneath the coracoid process to protect the underlying neurovascular structures.  And angled saw blade from medial to lateral was utilized to osteotomize the coracoid just anterior to the coracoclavicular ligaments. The remaining soft tissues on the undersurface of the coracoid were then removed allowing mobilization of the coracoid process. The length of the coracoid measured approximately 24 mm which we felt was adequate.  Next attention was paid toward exposing the glenohumeral joint. A subscapularis split was utilized. The splint was made at the upper 2/3 lower 1/3 junction of  the subscapularis.  Subscapularis was divided medially in line with the muscle fibers. A Ray-Ludy suture was used to develop the plane between the subscapularis musculature and the underlying anterior shoulder capsule. Next a Gelpi retractor was used to reflect the edges of the subscapularis.  The underlying capsule was identified. The capsule was divided in an upside-down L type fashion.  The superior medial portion of the capsule was tagged with a 2.  FiberWire stitch.  Exposure of the anterior glenoid was then obtained by placing a Fukuda retractor into the glenohumeral joint and retracting the humeral head posteriorly. A double prong Bankart type retractor was placed on the anterior glenoid neck allowing excellent exposure. Soft tissue on the anterior-inferior aspect of the glenoid was removed with a combination of a De La Garza elevator, bur, and a rasp.  The goal was to create a flat surface with bleeding bone. Next, the coracoid graft was prepared.  Using the Arthrex guide, 2 drill holes were made in the graft.  The graft was then positioned on the anterior inferior aspect of the glenoid from about the 3:00 to 5:00 position. The graft was held in a reduced position and then provisionally fixed with 2 small K-wires.  Neck is the Arthrex drill guide was used and placed within the graft.  Through the drill guide, 2 K-wires were placed. Over these K-wires a cannulated drill was used to drill for our screws.  Next, we chose respectively a 38 mm and then a 36 mm 3.75 mm fully-threaded cannulated Arthrex screw.  Both of these were placed over the guidewires.  Both screws provided excellent fixation of the graft to the native glenoid.  With digital palpation the graft appeared to be flush with the native glenoid except at the most superior extent.  This superior portion was burred down until it was flush with the native glenoid.  Suture washers had been placed prior to placement of the screws.  Sutures within the suture  washers as well as a 2.  FiberWire through the CA ligament was used to close the capsule as best as possible. This was done the with the arm in about 45° of external rotation. The wound was then copiously irrigated with saline. The tendinous split in the subscapularis was closed with 0 Vicryl most laterally. The deltopectoral interval was loosely approximated with 0 Vicryl suture with the cephalic vein superficial.  The subcutaneous skin layer was closed with interrupted buried 2 0 Vicryl suture.  The skin was closed with the running 3 0 subcuticular Monocryl stitch with Steri-Strips and Mastisol.  The patient was placed into a regular sling. The patient was awakened and then transferred to the postanesthesia care unit in stable condition.     Estimated blood loss: 100 cc    Complications: none known    Drains: none

## 2020-01-16 NOTE — H&P
[facility] Preop H&P    Reason For Visit    Right shoulder pain/instability     History of Present Illness    The patient is a 39 year old RHD male who was initially seen in 2015. He reported a history of multiple right shoulder dislocations. We discussed the possibility of arthroscopic stabilization. He was instructed to get me a copy of the MRI of his shoulder. We did not receive the MRI so surgery was not scheduled. In the meantime, he was in [facility]. He was released from [facility] earlier this year. He continues to report repetitive episodes of the shoulder slipping out. The shoulder slips out in his sleep and he reports that the shoulder slips out if he lifts the arm too high. He denies pain now but admits to 10/10 pain and numbness when he has instability episodes.   Here for surgery today    Allergies   · No Known Drug Allergies   · No Known Environmental Allergies   · No Known Food Allergies    Current Meds    Medication Name Instruction   Ambien 5 MG Oral Tablet      Active Problems   · Dislocation of shoulder region, right, sequela (S43.004S)   · Recurrent shoulder dislocation, right (M24.411)   · Shoulder pain, right (M25.511)    Past Medical History   · History of asthma (Z87.09)    Surgical History   · History of Anastomosis Of Gallbladder    Social History   · Current every day smoker (F17.200)   · Occasional alcohol use    Review of Systems    Review of systems have been reviewed and noted on the intake form dated 9/11/19     Results/Data    Right shoulder x-ray, three views,     Indication:  Pain    Findings:  The glenohumeral joint is currently reduced. There is blunting of the anterior glenoid on the axillary view which may represent anterior glenoid bony involvement. The acromiohumeral distance is normal.     Impression:  As above     Vitals  BP (!) 141/94 (BP Location: Right arm, Patient Position: Lying)   Pulse 107   Temp 98.4 °F (36.9 °C) (Skin)   Resp 16   Ht 6' (1.829 m)   Wt 117.9 kg (260 lb)    SpO2 98%   BMI 35.26 kg/m²     Physical Exam    General:  Alert male in no acute distress. Alert and oriented. He appears stated age of 39 year.     Cervical:  Nontender in the midline . Range of motion is within normal limits . Negative Spurlings maneuver.    Skin:  No rashes or cellulitis present about the right shoulder girdle.    Lymphatics:  No generalized lymphedema in the right upper extremity.    Musculoskeletal:  Right shoulder exam:  No atrophy or visible deformity is present. Tenderness to palpation in the following areas: none.     Range of motion testing reveals the following (R/L) :     Forward elevation:  150 with apprehension       External rotation at the side: 20/30     Internal rotation at the side: T10/T6    Rotator strength testing reveals the following     Forward elevation: 5/5     External rotation: 5/5     Internal rotation: 5/5    Rotator cuff provocative testing reveals the following:     There is a Negative Neer Impingement sign     There is a Negative Mcleod Impingement sign     There is a Negative Lift Off test     There is a Negative Bear Hug test      Biceps-labral provocative testing reveals the following:     There is a Negative Speeds test     There is a Negative Yergusons test    AC joint provocative testing reveals the following:     There is a Negative Cross-Body Adduction test    Stability testing reveals the following:     There is a Positive Anterior Apprehension test     There is a Positive Relocation test     There is a Negative Posterior Apprehension test     There is a Negative Jerk test     There is a Negative Sulcus sign     Grade 1 Anterior Load and Shift exam     Grade 2 Posterior Load and shift exam      G rade 2 Anterior Drawer     Grade 2 Posterior Drawer    In mid-abduction, he gets apprehensive with ER.     Neurological:  Intact light touch sensation in the axillary, musculocutaneous, radial, ulnar, and median nervous distributions of the right upper extremity.  Gross motor exam intact in right upper extremity.    Vascular:  2+ radial pulse right wrist.     Assessment   1. Recurrent shoulder dislocation, right (M24.411)    Plan    To OR for R shoulder Laterjet today    There are no interval changes to report in the history and physical exam.

## 2020-01-16 NOTE — TRANSFER OF CARE
Anesthesia Transfer of Care Note    Patient: Kojo Parada    Procedure(s) Performed: Procedure(s) (LRB):  R OPEN LATARJET PROCEDURE GETA + INTERSCALENE ARTHREX LATARJET SYSTEM (OPEN case) BRENNA, 90 SAW BLADE, open shoulder set, System 7 with Garza TKA blade and /TPS  5.5 brenna MicroAire, Beach Chair Positioner, will scope before opening  Arthrex (Right)    Patient location: PACU    Anesthesia Type: general    Transport from OR: Transported from OR on 6-10 L/min O2 by face mask with adequate spontaneous ventilation    Post pain: adequate analgesia    Post assessment: no apparent anesthetic complications and tolerated procedure well    Post vital signs: stable    Level of consciousness: awake, alert and oriented    Nausea/Vomiting: no nausea/vomiting    Complications: none          Last vitals:   Visit Vitals  /65   Pulse 99   Temp 36.9 °C (98.4 °F) (Skin)   Resp 16   Ht 6' (1.829 m)   Wt 117.9 kg (260 lb)   SpO2 97%   BMI 35.26 kg/m²

## 2020-01-16 NOTE — ANESTHESIA POSTPROCEDURE EVALUATION
Anesthesia Post Evaluation    Patient: Kojo Parada    Procedure(s) Performed: Procedure(s) (LRB):  R OPEN LATARJET PROCEDURE GETA + INTERSCALENE ARTHREX LATARJET SYSTEM (OPEN case) BRENNA, 90 SAW BLADE, open shoulder set, System 7 with Garza TKA blade and /TPS  5.5 brenna MicroAire, Beach Chair Positioner, will scope before opening  Arthrex (Right)    Final Anesthesia Type: general    Patient location during evaluation: PACU  Patient participation: Yes- Able to Participate  Level of consciousness: awake and alert  Post-procedure vital signs: reviewed and stable  Pain management: adequate  Airway patency: patent    PONV status at discharge: No PONV  Anesthetic complications: no      Cardiovascular status: blood pressure returned to baseline  Respiratory status: unassisted  Hydration status: euvolemic  Follow-up not needed.          Vitals Value Taken Time   /87 1/16/2020 11:50 AM   Temp 36.7 °C (98.1 °F) 1/16/2020 11:50 AM   Pulse 98 1/16/2020 11:50 AM   Resp 20 1/16/2020 11:50 AM   SpO2 98 % 1/16/2020 11:50 AM         Event Time     Out of Recovery 11:50:00          Pain/Naomi Score: Pain Rating Prior to Med Admin: 6 (1/16/2020 11:47 AM)  Pain Rating Post Med Admin: 4 (1/16/2020  1:00 PM)  Naomi Score: 10 (1/16/2020  1:00 PM)

## 2020-01-16 NOTE — PLAN OF CARE
0650: timeout completed with Dr. Pacheco and Dr. Dodd for right interscalene nerve block; consents x 2 verified; 5mg versed ivp given by Dr. Pacheco    0652: nerve block started; vss; no signs of discomfort noted    0654: nerve block finished; vss; pt tolerated well

## 2020-01-17 RX ORDER — CEFAZOLIN SODIUM 2 G/50ML
2 SOLUTION INTRAVENOUS
Status: ACTIVE | OUTPATIENT
Start: 2020-01-17

## 2020-03-03 ENCOUNTER — HISTORICAL (OUTPATIENT)
Dept: OCCUPATIONAL THERAPY | Facility: HOSPITAL | Age: 40
End: 2020-03-03

## 2020-03-09 ENCOUNTER — HISTORICAL (OUTPATIENT)
Dept: OCCUPATIONAL THERAPY | Facility: HOSPITAL | Age: 40
End: 2020-03-09

## 2020-03-12 ENCOUNTER — HISTORICAL (OUTPATIENT)
Dept: OCCUPATIONAL THERAPY | Facility: HOSPITAL | Age: 40
End: 2020-03-12

## 2020-06-02 ENCOUNTER — HISTORICAL (OUTPATIENT)
Dept: OCCUPATIONAL THERAPY | Facility: HOSPITAL | Age: 40
End: 2020-06-02

## 2020-07-02 ENCOUNTER — HISTORICAL (OUTPATIENT)
Dept: OCCUPATIONAL THERAPY | Facility: HOSPITAL | Age: 40
End: 2020-07-02

## 2021-09-17 ENCOUNTER — HISTORICAL (OUTPATIENT)
Dept: LAB | Facility: HOSPITAL | Age: 41
End: 2021-09-17

## 2021-09-17 LAB
(HCYS)2 SERPL-MCNC: 6.24 UMOL/L (ref 5.08–15.39)
ABS NEUT (OLG): 5.92
ALBUMIN SERPL-MCNC: 4.2 GM/DL (ref 3.5–5)
ALP SERPL-CCNC: 87 UNIT/L (ref 40–150)
ALT SERPL-CCNC: 63 UNIT/L (ref 0–55)
AMYLASE SERPL-CCNC: 30 UNIT/L (ref 25–125)
APPEARANCE, UA: CLEAR
AST SERPL-CCNC: 36 UNIT/L (ref 5–34)
BACTERIA SPEC CULT: ABNORMAL
BASOPHILS # BLD AUTO: 0.06 X10(3)/MCL
BASOPHILS NFR BLD AUTO: 0.5 %
BILIRUB SERPL-MCNC: 0.5 MG/DL
BILIRUB UR QL STRIP: NEGATIVE
BILIRUBIN DIRECT+TOT PNL SERPL-MCNC: 0.2 MG/DL (ref 0–0.5)
BILIRUBIN DIRECT+TOT PNL SERPL-MCNC: 0.3 MG/DL
BUN SERPL-MCNC: 12 MG/DL (ref 8.9–20.6)
CALCIUM SERPL-MCNC: 9.5 MG/DL (ref 8.4–10.2)
CHLORIDE SERPL-SCNC: 107 MMOL/L (ref 98–107)
CHOLEST SERPL-MCNC: 212 MG/DL
CHOLEST/HDLC SERPL: 4 {RATIO} (ref 0–5)
CO2 SERPL-SCNC: 22 MEQ/L (ref 22–29)
COLOR UR: YELLOW
CREAT SERPL-MCNC: 0.94 MG/DL (ref 0.73–1.18)
CREAT/UREA NIT SERPL: 13
CRP SERPL HS-MCNC: 0.23 MG/DL
DEPRECATED CALCIDIOL+CALCIFEROL SERPL-MC: 21.5 NG/ML (ref 30–80)
EOSINOPHIL # BLD AUTO: 0.19 X10(3)/MCL
EOSINOPHIL NFR BLD AUTO: 1.7 %
ERYTHROCYTE [DISTWIDTH] IN BLOOD BY AUTOMATED COUNT: 14 %
ERYTHROCYTE [SEDIMENTATION RATE] IN BLOOD: 8 MM/HR (ref 0–20)
EST. AVERAGE GLUCOSE BLD GHB EST-MCNC: 100 MG/DL
FERRITIN SERPL-MCNC: 175.97 NG/ML (ref 21.81–274.66)
FOLATE SERPL-MCNC: 14.9 NG/ML (ref 7–31.4)
FT4I SERPL CALC-MCNC: 2.71 (ref 2.6–3.6)
GGT SERPL-CCNC: 43 U/L (ref 12–64)
GLUCOSE (UA): NEGATIVE
GLUCOSE SERPL-MCNC: 94 MG/DL (ref 74–100)
HBA1C MFR BLD: 5.1 % (ref 4–6)
HCT VFR BLD AUTO: 43.6 % (ref 39–49)
HDLC SERPL-MCNC: 50 MG/DL (ref 35–60)
HGB BLD-MCNC: 14.8 GM/DL (ref 12.6–16.6)
HGB UR QL STRIP: NEGATIVE
IMM GRANULOCYTES # BLD AUTO: 0.07 10*3/UL (ref 0–0.1)
IMM GRANULOCYTES NFR BLD AUTO: 0.6 % (ref 0–1)
IRON SATN MFR SERPL: 16 % (ref 20–50)
IRON SERPL-MCNC: 53 UG/DL (ref 65–175)
KETONES UR QL STRIP: NEGATIVE
LDLC SERPL CALC-MCNC: 141 MG/DL (ref 50–140)
LEUKOCYTE ESTERASE UR QL STRIP: NEGATIVE
LIPASE SERPL-CCNC: 34 U/L
LYMPHOCYTES # BLD AUTO: 4.07 X10(3)/MCL
LYMPHOCYTES NFR BLD AUTO: 36.1 %
MAGNESIUM SERPL-MCNC: 1.9 MG/DL (ref 1.6–2.6)
MCH RBC QN AUTO: 28.8 PG (ref 27–33)
MCHC RBC AUTO-ENTMCNC: 33.9 GM/DL (ref 32–35)
MCV RBC AUTO: 84.8 FL (ref 84–97)
MONOCYTES # BLD AUTO: 0.96 X10(3)/MCL
MONOCYTES NFR BLD AUTO: 8.5 %
MUCOUS THREADS URNS QL MICRO: PRESENT
NEUTROPHILS # BLD AUTO: 5.92 X10(3)/MCL
NEUTROPHILS NFR BLD AUTO: 52.6 %
NITRITE UR QL STRIP: NEGATIVE
PH UR STRIP: 5.5 [PH] (ref 4.6–8)
PHOSPHATE SERPL-MCNC: 3.8 MG/DL (ref 2.3–4.7)
PLATELET # BLD AUTO: 340 X10(3)/MCL (ref 140–450)
PMV BLD AUTO: 10 FL
POTASSIUM SERPL-SCNC: 3.8 MMOL/L (ref 3.5–5.1)
PROT SERPL-MCNC: 7.7 GM/DL (ref 6.4–8.3)
PROT UR QL STRIP: NEGATIVE
RBC # BLD AUTO: 5.14 X10(6)/MCL (ref 4.3–5.6)
RBC #/AREA URNS HPF: ABNORMAL /HPF
RHEUMATOID FACT SERPL-ACNC: <13 IU/ML
SODIUM SERPL-SCNC: 141 MMOL/L (ref 136–145)
SP GR UR STRIP: >=1.03 (ref 1–1.03)
SQUAMOUS EPITHELIAL, UA: ABNORMAL
T3RU NFR SERPL: 33.45 % (ref 31–39)
T4 SERPL-MCNC: 8.09 UG/DL (ref 4.87–11.72)
TIBC SERPL-MCNC: 277 UG/DL (ref 69–240)
TIBC SERPL-MCNC: 330 UG/DL (ref 250–450)
TRANSFERRIN SERPL-MCNC: 308 MG/DL (ref 174–364)
TRIGL SERPL-MCNC: 104 MG/DL (ref 34–140)
TSH SERPL-ACNC: 6.88 UIU/ML (ref 0.35–4.94)
URATE SERPL-MCNC: 7.5 MG/DL (ref 3.8–7)
UROBILINOGEN UR STRIP-ACNC: 0.2
VIT B12 SERPL-MCNC: 614 PG/ML (ref 213–816)
VLDLC SERPL CALC-MCNC: 21 MG/DL
WBC # SPEC AUTO: 11.27 X10(3)/MCL (ref 3.4–9.2)
WBC #/AREA URNS HPF: ABNORMAL /HPF

## 2021-09-20 LAB — FINAL CULTURE: NO GROWTH

## 2021-09-30 ENCOUNTER — HISTORICAL (OUTPATIENT)
Dept: RADIOLOGY | Facility: HOSPITAL | Age: 41
End: 2021-09-30

## 2021-09-30 LAB
ABS NEUT (OLG): 5.14
BASOPHILS # BLD AUTO: 0.04 X10(3)/MCL
BASOPHILS NFR BLD AUTO: 0.4 %
EOSINOPHIL # BLD AUTO: 0.19 X10(3)/MCL
EOSINOPHIL NFR BLD AUTO: 1.9 %
ERYTHROCYTE [DISTWIDTH] IN BLOOD BY AUTOMATED COUNT: 14 %
HAV IGM SERPL QL IA: NONREACTIVE
HBV CORE IGM SERPL QL IA: NONREACTIVE
HBV SURFACE AG SERPL QL IA: NONREACTIVE
HCT VFR BLD AUTO: 42.4 % (ref 39–49)
HCV AB SERPL QL IA: NONREACTIVE
HEPATITIS PANEL INTERP: NORMAL
HGB BLD-MCNC: 14.3 GM/DL (ref 12.6–16.6)
IMM GRANULOCYTES # BLD AUTO: 0.04 10*3/UL (ref 0–0.1)
IMM GRANULOCYTES NFR BLD AUTO: 0.4 % (ref 0–1)
IRON SERPL-MCNC: 97 UG/DL (ref 65–175)
LYMPHOCYTES # BLD AUTO: 3.7 X10(3)/MCL
LYMPHOCYTES NFR BLD AUTO: 37.5 %
MCH RBC QN AUTO: 28.8 PG (ref 27–33)
MCHC RBC AUTO-ENTMCNC: 33.7 GM/DL (ref 32–35)
MCV RBC AUTO: 85.5 FL (ref 84–97)
MONOCYTES # BLD AUTO: 0.75 X10(3)/MCL
MONOCYTES NFR BLD AUTO: 7.6 %
NEUTROPHILS # BLD AUTO: 5.14 X10(3)/MCL
NEUTROPHILS NFR BLD AUTO: 52.2 %
PLATELET # BLD AUTO: 320 X10(3)/MCL (ref 140–450)
PMV BLD AUTO: 11 FL
RBC # BLD AUTO: 4.96 X10(6)/MCL (ref 4.3–5.6)
T3FREE SERPL-MCNC: 3.5 PG/ML (ref 1.58–3.91)
T4 FREE SERPL-MCNC: 0.88 NG/DL (ref 0.7–1.48)
TSH SERPL-ACNC: 4.85 UIU/ML (ref 0.35–4.94)
WBC # SPEC AUTO: 9.86 X10(3)/MCL (ref 3.4–9.2)

## 2023-10-12 ENCOUNTER — HOSPITAL ENCOUNTER (EMERGENCY)
Facility: HOSPITAL | Age: 43
Discharge: HOME | End: 2023-10-12
Attending: GENERAL PRACTICE
Payer: MEDICAID

## 2023-10-12 VITALS
HEART RATE: 87 BPM | DIASTOLIC BLOOD PRESSURE: 88 MMHG | RESPIRATION RATE: 18 BRPM | WEIGHT: 290 LBS | TEMPERATURE: 99 F | BODY MASS INDEX: 39.28 KG/M2 | SYSTOLIC BLOOD PRESSURE: 139 MMHG | HEIGHT: 72 IN | OXYGEN SATURATION: 98 %

## 2023-10-12 DIAGNOSIS — S63.501A RIGHT WRIST SPRAIN, INITIAL ENCOUNTER: Primary | ICD-10-CM

## 2023-10-12 DIAGNOSIS — M25.539 WRIST PAIN: ICD-10-CM

## 2023-10-12 PROCEDURE — 25000003 PHARM REV CODE 250: Performed by: GENERAL PRACTICE

## 2023-10-12 PROCEDURE — 99283 EMERGENCY DEPT VISIT LOW MDM: CPT

## 2023-10-12 RX ORDER — KETOROLAC TROMETHAMINE 10 MG/1
10 TABLET, FILM COATED ORAL
Status: COMPLETED | OUTPATIENT
Start: 2023-10-12 | End: 2023-10-12

## 2023-10-12 RX ADMIN — KETOROLAC TROMETHAMINE 10 MG: 10 TABLET, FILM COATED ORAL at 09:10

## 2023-10-13 NOTE — ED NOTES
Right wrist injury, falling after entanglement with a fuel hose. Landing he used both hands to break his fall and resulted in right wrist swelling and pain. He took 3 aspirin products before coming here. This occurred 1 hour before  admission. He is able to move hand, fingers with caution due to pain, 8/10. No discoloration. Slight swelling to posterior wrist, strong radial pulse.

## 2023-10-13 NOTE — ED NOTES
Final reading of x-ray discussed with Dr Ochoa .Will attempt to call pt back to ER for placement of cock up splint

## 2023-10-13 NOTE — ED PROVIDER NOTES
Encounter Date: 10/12/2023       History     Chief Complaint   Patient presents with    Wrist Injury    Fall     1 hour ago, he fell, breaking his fall with both hands, injuring his right wrist with pain and swelling.      1 hour ago, he fell, breaking his fall with both hands, injuring his right wrist with pain and swelling.     The history is provided by the patient.   Wrist Injury   The incident occurred just prior to arrival. The incident occurred at home. The injury mechanism was a fall. The pain is present in the right wrist. The quality of the pain is described as aching and burning. The pain is at a severity of 5/10. The pain has been Constant since the incident. He reports no foreign bodies present. The symptoms are aggravated by movement. He has tried nothing for the symptoms.   Fall      Review of patient's allergies indicates:  No Known Allergies  No past medical history on file.  Past Surgical History:   Procedure Laterality Date    CHOLECYSTECTOMY      TONSILLECTOMY      TYMPANOSTOMY TUBE PLACEMENT       No family history on file.  Social History     Tobacco Use    Smoking status: Every Day     Current packs/day: 1.00     Types: Cigarettes    Smokeless tobacco: Current   Substance Use Topics    Alcohol use: Yes     Comment: occasional     Review of Systems   Constitutional: Negative.    HENT: Negative.     Eyes: Negative.    Respiratory: Negative.     Cardiovascular: Negative.    Gastrointestinal: Negative.    Endocrine: Negative.    Genitourinary: Negative.    Musculoskeletal:  Positive for arthralgias and myalgias.   Skin: Negative.    Allergic/Immunologic: Negative.    Neurological: Negative.    Hematological: Negative.    Psychiatric/Behavioral: Negative.     All other systems reviewed and are negative.      Physical Exam     Initial Vitals [10/12/23 2121]   BP Pulse Resp Temp SpO2   (!) 155/101 100 18 98.6 °F (37 °C) 98 %      MAP       --         Physical Exam    Nursing note and vitals  reviewed.  Constitutional: He appears well-developed and well-nourished.   HENT:   Head: Normocephalic and atraumatic.   Eyes: EOM are normal. Pupils are equal, round, and reactive to light.   Neck: Neck supple.   Normal range of motion.  Cardiovascular:  Normal rate, regular rhythm, normal heart sounds and intact distal pulses.           Pulmonary/Chest: Breath sounds normal.   Abdominal: Abdomen is soft. Bowel sounds are normal.   Musculoskeletal:         General: Tenderness present. Normal range of motion.        Arms:       Cervical back: Normal range of motion and neck supple.      Comments: Mild swelling and tenderness. FROm of the right wrist     Neurological: He is alert and oriented to person, place, and time. He has normal strength. GCS score is 15. GCS eye subscore is 4. GCS verbal subscore is 5. GCS motor subscore is 6.   Skin: Skin is warm and dry.   Psychiatric: He has a normal mood and affect. His behavior is normal. Judgment and thought content normal.         ED Course   Procedures  Labs Reviewed - No data to display       Imaging Results              X-Ray Wrist Complete Right (Preliminary result)  Result time 10/12/23 21:44:04      Wet Read by Aramis Ochoa MD (10/12/23 21:44:04, [facility] - Emergency Dept, Emergency Medicine)    No acute fractures or dislocations are appreciated                                     Medications   ketorolac tablet 10 mg (10 mg Oral Given 10/12/23 2131)     Medical Decision Making  Amount and/or Complexity of Data Reviewed  Radiology: ordered and independent interpretation performed.    Risk  Prescription drug management.                               Clinical Impression:   Final diagnoses:  [M25.539] Wrist pain  [S63.501A] Right wrist sprain, initial encounter (Primary)        ED Disposition Condition    Discharge Stable          ED Prescriptions    None       Follow-up Information       Follow up With Specialties Details Why Contact Info    PCP  In 1  day As needed              Aramis cOhoa MD  10/12/23 2312

## (undated) DEVICE — SEE MEDLINE ITEM 146292

## (undated) DEVICE — Device

## (undated) DEVICE — SLING ARM COMFT NAVY BLU LG

## (undated) DEVICE — SUT ETHILON 3-0 PS2 18 BLK

## (undated) DEVICE — MAT SUCTION PUDDLEVAC ORANGE

## (undated) DEVICE — CLOSURE SKIN STERI STRIP 1/2X4

## (undated) DEVICE — SEE MEDLINE ITEM 157125

## (undated) DEVICE — SUT FIBERWIRE 2 CLLGN COAT

## (undated) DEVICE — BLADE OSCIL 9MM X .4MM X 31MM

## (undated) DEVICE — SEE MEDLINE ITEM 157116

## (undated) DEVICE — PAD PREP 50/CA

## (undated) DEVICE — GAUZE SPONGE 4X4 12PLY

## (undated) DEVICE — GLOVE BIOGEL PI MICRO INDIC 8

## (undated) DEVICE — IMPLANTABLE DEVICE
Type: IMPLANTABLE DEVICE | Site: SHOULDER | Status: NON-FUNCTIONAL
Removed: 2020-01-16

## (undated) DEVICE — TAPE CLOTH SOFT MEDIPORE 4IN

## (undated) DEVICE — DRAPE STERI-DRAPE 1000 17X11IN

## (undated) DEVICE — MANIFOLD 4 PORT

## (undated) DEVICE — SEE MEDLINE ITEM 152572

## (undated) DEVICE — SEE MEDLINE ITEM 156955

## (undated) DEVICE — SYR 50CC LL

## (undated) DEVICE — GOWN SURGICAL XX LARGE X LONG

## (undated) DEVICE — ELECTRODE REM PLYHSV RETURN 9

## (undated) DEVICE — DRAPE INCISE IOBAN 2 23X23IN

## (undated) DEVICE — SUT 2-0 ETHILON 18 FS

## (undated) DEVICE — APPLICATOR CHLORAPREP ORN 26ML

## (undated) DEVICE — ABLATOR EXTENDED LENGTH

## (undated) DEVICE — INSTRAMOD SHOULDER TRACTION

## (undated) DEVICE — GLOVE BIOGEL ORTHOPEDIC 8

## (undated) DEVICE — SUPPORT ULNA NERVE PROTECTOR

## (undated) DEVICE — TUBE SET INFLOW/OUTFLOW

## (undated) DEVICE — DRESSING AQUACEL SACRAL 9 X 9

## (undated) DEVICE — NDL SPINAL 18GX3.5 SPINOCAN

## (undated) DEVICE — SEE MEDLINE ITEM 146313

## (undated) DEVICE — PAD ABDOMINAL 5X9 STERILE

## (undated) DEVICE — DRAPE PLASTIC U 60X72

## (undated) DEVICE — ADHESIVE DERMABOND ADVANCED

## (undated) DEVICE — SEE MEDLINE ITEM 152622

## (undated) DEVICE — PACK FLUID CONTROL SHOULDER

## (undated) DEVICE — BLADE SAGITTA 5/BX

## (undated) DEVICE — WAX BONE STERILE 2.5G

## (undated) DEVICE — SEE MEDLINE ITEM 146420

## (undated) DEVICE — SEE MEDLINE ITEM 157131

## (undated) DEVICE — DRAPE STERI U-SHAPED 47X51IN

## (undated) DEVICE — BLADE SURG CARBON STEEL SZ11

## (undated) DEVICE — COVER OVERHEAD SURG LT BLUE

## (undated) DEVICE — DRESSING XEROFORM FOIL PK 1X8